# Patient Record
Sex: MALE | Race: WHITE | Employment: FULL TIME | URBAN - METROPOLITAN AREA
[De-identification: names, ages, dates, MRNs, and addresses within clinical notes are randomized per-mention and may not be internally consistent; named-entity substitution may affect disease eponyms.]

---

## 2020-04-12 ENCOUNTER — APPOINTMENT (OUTPATIENT)
Dept: GENERAL RADIOLOGY | Age: 40
End: 2020-04-12
Payer: COMMERCIAL

## 2020-04-12 ENCOUNTER — HOSPITAL ENCOUNTER (EMERGENCY)
Age: 40
Discharge: HOME | End: 2020-04-12
Payer: COMMERCIAL

## 2020-04-12 VITALS
OXYGEN SATURATION: 100 % | RESPIRATION RATE: 12 BRPM | SYSTOLIC BLOOD PRESSURE: 132 MMHG | HEART RATE: 60 BPM | WEIGHT: 228 LBS | BODY MASS INDEX: 31.92 KG/M2 | TEMPERATURE: 97.8 F | DIASTOLIC BLOOD PRESSURE: 95 MMHG | HEIGHT: 71 IN

## 2020-04-12 LAB
ALBUMIN SERPL-MCNC: 4.9 GM/DL (ref 3.4–5)
ALP BLD-CCNC: 74 IU/L (ref 40–129)
ALT SERPL-CCNC: 40 U/L (ref 10–40)
ANION GAP SERPL CALCULATED.3IONS-SCNC: 13 MMOL/L (ref 4–16)
AST SERPL-CCNC: 29 IU/L (ref 15–37)
BASOPHILS ABSOLUTE: 0 K/CU MM
BASOPHILS RELATIVE PERCENT: 0.1 % (ref 0–1)
BILIRUB SERPL-MCNC: 0.4 MG/DL (ref 0–1)
BUN BLDV-MCNC: 11 MG/DL (ref 6–23)
CALCIUM SERPL-MCNC: 10.1 MG/DL (ref 8.3–10.6)
CHLORIDE BLD-SCNC: 104 MMOL/L (ref 99–110)
CO2: 23 MMOL/L (ref 21–32)
CREAT SERPL-MCNC: 0.9 MG/DL (ref 0.9–1.3)
DIFFERENTIAL TYPE: ABNORMAL
EOSINOPHILS ABSOLUTE: 0 K/CU MM
EOSINOPHILS RELATIVE PERCENT: 0.5 % (ref 0–3)
GFR AFRICAN AMERICAN: >60 ML/MIN/1.73M2
GFR NON-AFRICAN AMERICAN: >60 ML/MIN/1.73M2
GLUCOSE BLD-MCNC: 108 MG/DL (ref 70–99)
HCT VFR BLD CALC: 43.3 % (ref 42–52)
HEMOGLOBIN: 14.5 GM/DL (ref 13.5–18)
IMMATURE NEUTROPHIL %: 0.3 % (ref 0–0.43)
LYMPHOCYTES ABSOLUTE: 2.7 K/CU MM
LYMPHOCYTES RELATIVE PERCENT: 36.3 % (ref 24–44)
MCH RBC QN AUTO: 30.5 PG (ref 27–31)
MCHC RBC AUTO-ENTMCNC: 33.5 % (ref 32–36)
MCV RBC AUTO: 91.2 FL (ref 78–100)
MONOCYTES ABSOLUTE: 0.6 K/CU MM
MONOCYTES RELATIVE PERCENT: 8.2 % (ref 0–4)
NUCLEATED RBC %: 0 %
PDW BLD-RTO: 12.8 % (ref 11.7–14.9)
PLATELET # BLD: 234 K/CU MM (ref 140–440)
PMV BLD AUTO: 10.9 FL (ref 7.5–11.1)
POTASSIUM SERPL-SCNC: 3.9 MMOL/L (ref 3.5–5.1)
PRO-BNP: 69.01 PG/ML
RBC # BLD: 4.75 M/CU MM (ref 4.6–6.2)
SEGMENTED NEUTROPHILS ABSOLUTE COUNT: 4 K/CU MM
SEGMENTED NEUTROPHILS RELATIVE PERCENT: 54.6 % (ref 36–66)
SODIUM BLD-SCNC: 140 MMOL/L (ref 135–145)
TOTAL IMMATURE NEUTOROPHIL: 0.02 K/CU MM
TOTAL NUCLEATED RBC: 0 K/CU MM
TOTAL PROTEIN: 7.9 GM/DL (ref 6.4–8.2)
TROPONIN T: <0.01 NG/ML
WBC # BLD: 7.4 K/CU MM (ref 4–10.5)

## 2020-04-12 PROCEDURE — 84484 ASSAY OF TROPONIN QUANT: CPT

## 2020-04-12 PROCEDURE — 80053 COMPREHEN METABOLIC PANEL: CPT

## 2020-04-12 PROCEDURE — 93005 ELECTROCARDIOGRAM TRACING: CPT | Performed by: PHYSICIAN ASSISTANT

## 2020-04-12 PROCEDURE — 85025 COMPLETE CBC W/AUTO DIFF WBC: CPT

## 2020-04-12 PROCEDURE — 71045 X-RAY EXAM CHEST 1 VIEW: CPT

## 2020-04-12 PROCEDURE — 83880 ASSAY OF NATRIURETIC PEPTIDE: CPT

## 2020-04-12 PROCEDURE — 99285 EMERGENCY DEPT VISIT HI MDM: CPT

## 2020-04-12 ASSESSMENT — PAIN DESCRIPTION - LOCATION: LOCATION: CHEST

## 2020-04-12 ASSESSMENT — PAIN SCALES - GENERAL: PAINLEVEL_OUTOF10: 3

## 2020-04-12 ASSESSMENT — PAIN DESCRIPTION - PAIN TYPE: TYPE: ACUTE PAIN

## 2020-04-12 NOTE — ED PROVIDER NOTES
or ear pain   Eyes: Denies vision changes, eye pain  Cardiovascular:  Denies chest pain, syncope  Respiratory:  Denies cough.  + SOB  GI:  Denies abdominal pain, nausea, vomiting  :  Denies dysuria, discharge  Musculoskeletal:  Denies joint pain.  + back pain  Skin:  Denies rash, pruritis  Neurologic:  Denies headache, focal weakness. + tingling     See HPI and nursing notes for additional information     I have reviewed the following nursing documentation:  Allergies: Allergies   Allergen Reactions    Sulfa Antibiotics        Past medical history:  has a past medical history of Seasonal allergies. Past surgical history:  has no past surgical history on file. Home medications:   Prior to Admission medications    Not on File       Social history:  reports that he has never smoked. He has quit using smokeless tobacco. He reports previous alcohol use. He reports that he does not use drugs. Family history:  History reviewed. No pertinent family history. Exam  /84   Pulse 59   Temp 97.8 °F (36.6 °C) (Oral)   Resp 9   Ht 5' 11\" (1.803 m)   Wt 228 lb (103.4 kg)   SpO2 100%   BMI 31.80 kg/m²   Nursing note and vitals reviewed. Constitutional: Well developed, well nourished. No acute distress. HENT:      Head: Normocephalic and atraumatic. Ears: External ears normal.      Nose: Nose normal.     Mouth: Membrane mucosa moist and pink. No posterior oropharynx erythema or tonsillar edema  Eyes: Anicteric sclera. No discharge, PERRL  Neck: Supple. Trachea midline. Cardiovascular: RRR, no murmurs, rubs, or gallops, radial pulses 2+ bilaterally. Pulmonary/Chest: Effort normal. No respiratory distress. CTAB. No stridor. No wheezes. No rales. Abdominal: Soft. Nontender to palpation. No distension. No guarding, rebound tenderness, or evidence of ascites. : No CVA tenderness. Musculoskeletal: Moves all extremities. No gross deformity.   No TTP of C, T, L spine or paraspinal muscles. No upper or lower extremity swelling or TTP noted bilat. No palpable cord. Neurological: Alert and oriented to person, place, and time. Normal muscle tone. Skin: Warm and dry. No rash. Psychiatric: Normal mood and affect. Behavior is normal.      EKG   Please see Dr. Jacob Mendoza note for EKG read.       Radiographs (if obtained):  [] The following radiograph was interpreted by myself in the absence of a radiologist:   [x] Radiologist's Report Reviewed:  XR CHEST PORTABLE   Preliminary Result   Normal chest.                Labs  Results for orders placed or performed during the hospital encounter of 04/12/20   CBC auto diff   Result Value Ref Range    WBC 7.4 4.0 - 10.5 K/CU MM    RBC 4.75 4.6 - 6.2 M/CU MM    Hemoglobin 14.5 13.5 - 18.0 GM/DL    Hematocrit 43.3 42 - 52 %    MCV 91.2 78 - 100 FL    MCH 30.5 27 - 31 PG    MCHC 33.5 32.0 - 36.0 %    RDW 12.8 11.7 - 14.9 %    Platelets 854 759 - 139 K/CU MM    MPV 10.9 7.5 - 11.1 FL    Differential Type AUTOMATED DIFFERENTIAL     Segs Relative 54.6 36 - 66 %    Lymphocytes % 36.3 24 - 44 %    Monocytes % 8.2 (H) 0 - 4 %    Eosinophils % 0.5 0 - 3 %    Basophils % 0.1 0 - 1 %    Segs Absolute 4.0 K/CU MM    Lymphocytes Absolute 2.7 K/CU MM    Monocytes Absolute 0.6 K/CU MM    Eosinophils Absolute 0.0 K/CU MM    Basophils Absolute 0.0 K/CU MM    Nucleated RBC % 0.0 %    Total Nucleated RBC 0.0 K/CU MM    Total Immature Neutrophil 0.02 K/CU MM    Immature Neutrophil % 0.3 0 - 0.43 %   CMP   Result Value Ref Range    Sodium 140 135 - 145 MMOL/L    Potassium 3.9 3.5 - 5.1 MMOL/L    Chloride 104 99 - 110 mMol/L    CO2 23 21 - 32 MMOL/L    BUN 11 6 - 23 MG/DL    CREATININE 0.9 0.9 - 1.3 MG/DL    Glucose 108 (H) 70 - 99 MG/DL    Calcium 10.1 8.3 - 10.6 MG/DL    Alb 4.9 3.4 - 5.0 GM/DL    Total Protein 7.9 6.4 - 8.2 GM/DL    Total Bilirubin 0.4 0.0 - 1.0 MG/DL    ALT 40 10 - 40 U/L    AST 29 15 - 37 IU/L    Alkaline Phosphatase 74 40 - 129 IU/L    GFR

## 2020-04-12 NOTE — ED PROVIDER NOTES
12 lead EKG per my interpretation:  Normal Sinus Rhythm 67  Axis is   Normal  QTc is  464  There is no specific T wave changes appreciated. There is no specific ST wave changes appreciated.     Prior EKG to compare with was not available        Martell Nuñez DO  04/12/20 3550

## 2020-04-12 NOTE — ED NOTES
Discharge instructions reviewed with patient; pt voiced understanding at this time.       Manjula Beckman RN  04/12/20 4517

## 2020-04-12 NOTE — ED NOTES
Bed: ED-06  Expected date:   Expected time:   Means of arrival:   Comments:  [address], [place]  04/12/20 4351

## 2020-04-13 ENCOUNTER — TELEPHONE (OUTPATIENT)
Dept: OTHER | Facility: CLINIC | Age: 40
End: 2020-04-13

## 2020-04-13 PROCEDURE — 93010 ELECTROCARDIOGRAM REPORT: CPT | Performed by: INTERNAL MEDICINE

## 2020-04-13 NOTE — TELEPHONE ENCOUNTER
COVID-19 ED/Flu Clinic Initial Outreach Note    Patient contacted regarding recent visit for viral symptoms. This Malik Xie contacted the patient by telephone to perform post discharge call. Verified name and  with patient as identifiers. Provided introduction to self, and reason for call due to viral symptoms of infection and/or exposure to COVID-19. Patient presented to emergency department/flu clinic with complaints of viral symptoms/exposure to COVID. Patient reports symptoms are improving. Due to no new or worsening symptoms the RN CTN/ACM was not notified for escalation. Discussed exposure protocols and quarantine with CDC Guidelines What To Do If You Are Sick    Patient was given an opportunity for questions and concerns. Stay home except to get medical care    Separate yourself from other people and animals in your home    Call ahead before visiting your doctor    Wear a facemask    Cover your coughs and sneezes    Clean your hands often    Avoid sharing personal household items    Clean all high-touch surfaces everyday    Monitor your symptoms  Seek prompt medical attention if your illness is worsening (e.g., difficulty breathing). Before seeking care, call your healthcare provider and tell them that you have, or are being evaluated for, COVID-19. Put on a facemask before you enter the facility. These steps will help the healthcare provider's office to keep other people in the office or waiting room from getting infected or exposed. Ask your healthcare provider to call the local or [facility] health department. Persons who are placed under If you have a medical emergency and need to call 911, notify the dispatch personnel that you have, or are being evaluated for COVID-19. If possible, put on a facemask before emergency medical services arrive.     The patient agrees to contact the [facility] exposure line [phone], local health department  and PCP office for questions related to their

## 2020-04-16 LAB
EKG ATRIAL RATE: 67 BPM
EKG DIAGNOSIS: NORMAL
EKG P AXIS: 41 DEGREES
EKG P-R INTERVAL: 146 MS
EKG Q-T INTERVAL: 440 MS
EKG QRS DURATION: 100 MS
EKG QTC CALCULATION (BAZETT): 464 MS
EKG R AXIS: 54 DEGREES
EKG T AXIS: 43 DEGREES
EKG VENTRICULAR RATE: 67 BPM

## 2020-08-08 ENCOUNTER — HOSPITAL ENCOUNTER (EMERGENCY)
Age: 40
Discharge: HOME | End: 2020-08-08
Attending: EMERGENCY MEDICINE
Payer: COMMERCIAL

## 2020-08-08 ENCOUNTER — APPOINTMENT (OUTPATIENT)
Dept: ULTRASOUND IMAGING | Age: 40
End: 2020-08-08
Payer: COMMERCIAL

## 2020-08-08 VITALS
DIASTOLIC BLOOD PRESSURE: 83 MMHG | OXYGEN SATURATION: 95 % | TEMPERATURE: 98.8 F | HEART RATE: 75 BPM | RESPIRATION RATE: 18 BRPM | WEIGHT: 215 LBS | HEIGHT: 71 IN | BODY MASS INDEX: 30.1 KG/M2 | SYSTOLIC BLOOD PRESSURE: 130 MMHG

## 2020-08-08 PROCEDURE — 6370000000 HC RX 637 (ALT 250 FOR IP): Performed by: PHYSICIAN ASSISTANT

## 2020-08-08 PROCEDURE — 99284 EMERGENCY DEPT VISIT MOD MDM: CPT

## 2020-08-08 PROCEDURE — 81001 URINALYSIS AUTO W/SCOPE: CPT

## 2020-08-08 PROCEDURE — 76870 US EXAM SCROTUM: CPT

## 2020-08-08 PROCEDURE — 93975 VASCULAR STUDY: CPT

## 2020-08-08 RX ORDER — HYDROCODONE BITARTRATE AND ACETAMINOPHEN 5; 325 MG/1; MG/1
1 TABLET ORAL ONCE
Status: COMPLETED | OUTPATIENT
Start: 2020-08-08 | End: 2020-08-08

## 2020-08-08 RX ORDER — NAPROXEN 500 MG/1
500 TABLET ORAL 2 TIMES DAILY
Qty: 15 TABLET | Refills: 0 | Status: SHIPPED | OUTPATIENT
Start: 2020-08-08

## 2020-08-08 RX ORDER — HYDROCODONE BITARTRATE AND ACETAMINOPHEN 5; 325 MG/1; MG/1
1 TABLET ORAL EVERY 6 HOURS PRN
Qty: 8 TABLET | Refills: 0 | Status: SHIPPED | OUTPATIENT
Start: 2020-08-08 | End: 2020-08-13

## 2020-08-08 RX ADMIN — HYDROCODONE BITARTRATE AND ACETAMINOPHEN 1 TABLET: 5; 325 TABLET ORAL at 21:34

## 2020-08-08 ASSESSMENT — PAIN SCALES - GENERAL
PAINLEVEL_OUTOF10: 6
PAINLEVEL_OUTOF10: 5
PAINLEVEL_OUTOF10: 2

## 2020-08-08 ASSESSMENT — PAIN DESCRIPTION - PROGRESSION: CLINICAL_PROGRESSION: RAPIDLY IMPROVING

## 2020-08-08 NOTE — ED TRIAGE NOTES
Pt presents to ED stating he was cutting wood when it flung out and hit him in the groin. Pt states the right side is the main side hurting. Pt rates pain 7/10. Pt is alert and oriented.

## 2020-08-09 NOTE — ED NOTES
Pt presents for pain in the right testicle/groin. Pt states he was using a table saw when it backfired and caused a blunt injury to groin.  Pt states pain has decreased since arrival      [facility]  08/08/20 2717

## 2020-08-09 NOTE — DISCHARGE INSTR - COC
Continuity of Care Form    Patient Name: Nehal Roa   :  1980  MRN:  [id number]    Admit date:  2020  Discharge date:  ***    Code Status Order: No Order   Advance Directives:     Admitting Physician:  No admitting provider for patient encounter. PCP: No primary care provider on file. Discharging Nurse: [facility] Unit/Room#: ED21/ED-21  Discharging Unit Phone Number: ***    Emergency Contact:   Extended Emergency Contact Information  Primary Emergency Contact: [facility] Phone: [phone]  Relation: Spouse    Past Surgical History:  History reviewed. No pertinent surgical history. Immunization History: There is no immunization history on file for this patient. Active Problems: There is no problem list on file for this patient.       Isolation/Infection:   Isolation          No Isolation        Patient Infection Status     None to display          Nurse Assessment:  Last Vital Signs: /83   Pulse 75   Temp 98.8 °F (37.1 °C) (Oral)   Resp 18   Ht 5' 11\" (1.803 m)   Wt 215 lb (97.5 kg)   SpO2 95%   BMI 29.99 kg/m²     Last documented pain score (0-10 scale): Pain Level: 2  Last Weight:   Wt Readings from Last 1 Encounters:   20 215 lb (97.5 kg)     Mental Status:  {IP PT MENTAL STATUS:}    IV Access:  { GILMER IV ACCESS:038501102}    Nursing Mobility/ADLs:  Walking   {P DME JJZK:470100445}  Transfer  {P DME WLBF:286669139}  Bathing  {P DME IJHF:783353553}  Dressing  {CHP DME RLQY:449525735}  Toileting  {P DME QUQP:526608766}  Feeding  {P DME VFFH:806259368}  Med Admin  {[facility] DME UFLL:954586589}  Med Delivery   { GILMER MED Delivery:435457304}    Wound Care Documentation and Therapy:        Elimination:  Continence:   · Bowel: {YES / DD:17332}  · Bladder: {YES / X}  Urinary Catheter: {Urinary Catheter:145478940}   Colostomy/Ileostomy/Ileal Conduit: {YES / XP:45485}       Date of Last BM: ***  No intake or output data in the 24 hours ending 20 2312  No intake/output data recorded.     Safety Concerns:     [address] Safety Concerns:344136943}    Impairments/Disabilities:      [address] GILMER Impairments/Disabilities:622826372}    Nutrition Therapy:  Current Nutrition Therapy:   [address] Crouch GILMER Diet List:728174462}    Routes of Feeding: {CHP DME Other Feedings:991271378}  Liquids: {Slp liquid thickness:21742}  Daily Fluid Restriction: {CHP DME Yes amt example:245545958}  Last Modified Barium Swallow with Video (Video Swallowing Test): {Done Not Done SZHO:256695185}    Treatments at the Time of Hospital Discharge:   Respiratory Treatments: ***  Oxygen Therapy:  {Therapy; copd oxygen:46621}  Ventilator:    { CC Vent PEMH:676274949}    Rehab Therapies: {THERAPEUTIC INTERVENTION:4410359383}  Weight Bearing Status/Restrictions: Chelsy Crouch  Weight Bearin}  Other Medical Equipment (for information only, NOT a DME order):  {EQUIPMENT:402889469}  Other Treatments: ***    Patient's personal belongings (please select all that are sent with patient):  {CHP DME Belongings:874687809}    RN SIGNATURE:  {Esignature:701237549}    CASE MANAGEMENT/SOCIAL WORK SECTION    Inpatient Status Date: ***    Readmission Risk Assessment Score:  Readmission Risk              Risk of Unplanned Readmission:        0           Discharging to Facility/ Agency   · Name:   · Address:  · Phone:  · Fax:    Dialysis Facility (if applicable)   · Name:  · Address:  · Dialysis Schedule:  · Phone:  · Fax:    / signature: {Esignature:447630337}    PHYSICIAN SECTION    Prognosis: {Prognosis:0009465614}    Condition at Discharge: 50Haylie Crouch Patient Condition:011137671}    Rehab Potential (if transferring to Rehab): {Prognosis:4342337479}    Recommended Labs or Other Treatments After Discharge: ***    Physician Certification: I certify the above information and transfer of Estrellita Vick  is necessary for the continuing treatment of the diagnosis listed and that he requires {Admit to Appropriate Level of Care:08547} for {GREATER/LESS:112295409} 30 days.      Update Admission H&P: {CHP DME Changes in XCQBT:347762958}    PHYSICIAN SIGNATURE:  {Esignature:621015261}

## 2020-08-09 NOTE — ED PROVIDER NOTES
Spouse name: None    Number of children: None    Years of education: None    Highest education level: None   Occupational History    None   Social Needs    Financial resource strain: None    Food insecurity     Worry: None     Inability: None    Transportation needs     Medical: None     Non-medical: None   Tobacco Use    Smoking status: Never Smoker    Smokeless tobacco: Former User   Substance and Sexual Activity    Alcohol use: Not Currently    Drug use: Never    Sexual activity: None   Lifestyle    Physical activity     Days per week: None     Minutes per session: None    Stress: None   Relationships    Social connections     Talks on phone: None     Gets together: None     Attends Druze service: None     Active member of club or organization: None     Attends meetings of clubs or organizations: None     Relationship status: None    Intimate partner violence     Fear of current or ex partner: None     Emotionally abused: None     Physically abused: None     Forced sexual activity: None   Other Topics Concern    None   Social History Narrative    None     History reviewed. No pertinent family history. PHYSICAL EXAM    VITAL SIGNS: /83   Pulse 75   Temp 98.8 °F (37.1 °C) (Oral)   Resp 18   Ht 5' 11\" (1.803 m)   Wt 215 lb (97.5 kg)   SpO2 95%   BMI 29.99 kg/m²   Constitutional:  Well developed, well nourished  Eyes:  Sclera nonicteric, conjunctiva moist  HENT:  Atraumatic, normocephalic. Neck: Supple, no JVD  Respiratory:  No retractions, no accessory muscle use, normal breath sounds   Cardiovascular:  Normal rate, regular rhythm, no murmurs  GI:  Soft, no abdominal tenderness, no guarding, bowel sounds present, no audible bruits or palpable pulsatile masses. :   Normally developed external genitalia,   Skin intact. Right testicle is swollen and firm to palpation. Right testicular tenderness to palpation.   No palpable inguinal inappropriate. If there are any questions or concerns please feel free to contact the dictating provider for clarification.       Alta Lundy PA-C  08/08/20 3568

## 2020-08-09 NOTE — ED PROVIDER NOTES
Assigned  Myself in error. I did not see this patient or participate in his care.     Electronically signed by Adrian Warren MD on 8/8/2020 at 8:56 PM        Adrian Warren MD  08/08/20 2056

## 2020-08-09 NOTE — ED PROVIDER NOTES
I independently examined and evaluated Aiden Grant. In brief their history revealed a 47 y.o. male who presents with testicle pain located on the right side since the onset 5:45 pm.  The context is patient states he was cutting wood with a table saw when a piece of wood kicked back and hit him in the right testicle. Patient had associated nausea and diaphoresis during initial pain. The duration has been constant since the onset. The pain quality is sharp, the pain worsens with movement or palpation of the testicle. Patient had taken ibuprofen with minimal improvement in pain. Their focused exam revealed alert and oriented male resting in bed no distress normocephalic atraumatic sclera clear airway normal lungs clear heart regular rhythm 2+ pulses throughout abdomen soft mildly tender palpation right lower quadrant no hernia no bruising no swelling no rash. Testicle exam penis is normal his right testicle is swollen has a couple of small bruises is tender no erythema does have some slight again swelling no crepitus. No adenopathy 5-5 strength throughout skin has no other rash or swelling. No discharge or bleeding. ED course: Patient seen with PA please see his note. Patient here with testicle trauma. Again he got hit with a piece of wood while using a table saw in his groin. Has right testicle pain and swelling couple bruises he has no dysuria denies any hematuria his penis is otherwise normal just right testicle pain and swelling. Imaging shows some small hemorrhages and a small hydrocele we did talk to urology okay with outpatient follow-up no signs of torsion or rupture otherwise patient stable I do not think he needs additional labs or imaging given pain nausea medicine discharged home again given return precautions and follow-up with patient stable. All diagnostic, treatment, and disposition decisions were made by myself in conjunction with the Advanced Practice Provider.     For all further details of the patient's emergency department visit, please see the Advanced Practice Provider's documentation.         Flex Yañez,   08/08/20 2608

## 2020-10-29 ENCOUNTER — HOSPITAL ENCOUNTER (OUTPATIENT)
Dept: PULMONOLOGY | Age: 40
Discharge: HOME | End: 2020-10-29
Payer: COMMERCIAL

## 2020-10-29 LAB
DLCO %PRED: 122 %
DLCO PRED: NORMAL
DLCO/VA %PRED: NORMAL
DLCO/VA PRED: NORMAL
DLCO/VA: NORMAL
DLCO: NORMAL
EXPIRATORY TIME-POST: NORMAL
EXPIRATORY TIME: NORMAL
FEF 25-75% %CHNG: NORMAL
FEF 25-75% %PRED-POST: NORMAL
FEF 25-75% %PRED-PRE: NORMAL
FEF 25-75% PRED: NORMAL
FEF 25-75%-POST: NORMAL
FEF 25-75%-PRE: NORMAL
FEV1 %PRED-POST: 109 %
FEV1 %PRED-PRE: 103 %
FEV1 PRED: NORMAL
FEV1-POST: NORMAL
FEV1-PRE: NORMAL
FEV1/FVC %PRED-POST: NORMAL
FEV1/FVC %PRED-PRE: NORMAL
FEV1/FVC PRED: NORMAL
FEV1/FVC-POST: 102 %
FEV1/FVC-PRE: 94 %
FVC %PRED-POST: NORMAL
FVC %PRED-PRE: NORMAL
FVC PRED: NORMAL
FVC-POST: NORMAL
FVC-PRE: NORMAL
GAW %PRED: NORMAL
GAW PRED: NORMAL
GAW: NORMAL
IC %PRED: NORMAL
IC PRED: NORMAL
IC: NORMAL
MEP: NORMAL
MIP: NORMAL
MVV %PRED-PRE: NORMAL
MVV PRED: NORMAL
MVV-PRE: NORMAL
PEF %PRED-POST: NORMAL
PEF %PRED-PRE: NORMAL
PEF PRED: NORMAL
PEF%CHNG: NORMAL
PEF-POST: NORMAL
PEF-PRE: NORMAL
RAW %PRED: NORMAL
RAW PRED: NORMAL
RAW: NORMAL
RV %PRED: NORMAL
RV PRED: NORMAL
RV: NORMAL
SVC %PRED: NORMAL
SVC PRED: NORMAL
SVC: NORMAL
TLC %PRED: 109 %
TLC PRED: NORMAL
TLC: NORMAL
VA %PRED: NORMAL
VA PRED: NORMAL
VA: NORMAL
VTG %PRED: NORMAL
VTG PRED: NORMAL
VTG: NORMAL

## 2020-10-29 PROCEDURE — 94060 EVALUATION OF WHEEZING: CPT

## 2020-10-29 PROCEDURE — 94726 PLETHYSMOGRAPHY LUNG VOLUMES: CPT

## 2020-10-29 PROCEDURE — 94729 DIFFUSING CAPACITY: CPT

## 2020-10-29 ASSESSMENT — PULMONARY FUNCTION TESTS
FEV1_PERCENT_PREDICTED_PRE: 103
FEV1/FVC_POST: 102
FEV1/FVC_PRE: 94
FEV1_PERCENT_PREDICTED_POST: 109

## 2022-02-08 ENCOUNTER — HOSPITAL ENCOUNTER (OUTPATIENT)
Age: 42
Setting detail: SPECIMEN
Discharge: HOME | End: 2022-02-08
Payer: COMMERCIAL

## 2022-02-08 LAB — MICRO: NORMAL

## 2022-02-08 PROCEDURE — 89321 SEMEN ANAL SPERM DETECTION: CPT
